# Patient Record
Sex: MALE | Race: BLACK OR AFRICAN AMERICAN | NOT HISPANIC OR LATINO | URBAN - METROPOLITAN AREA
[De-identification: names, ages, dates, MRNs, and addresses within clinical notes are randomized per-mention and may not be internally consistent; named-entity substitution may affect disease eponyms.]

---

## 2023-10-09 ENCOUNTER — OFFICE VISIT (OUTPATIENT)
Dept: PRIMARY CARE | Facility: CLINIC | Age: 53
End: 2023-10-09
Payer: COMMERCIAL

## 2023-10-09 VITALS
HEART RATE: 76 BPM | SYSTOLIC BLOOD PRESSURE: 122 MMHG | RESPIRATION RATE: 15 BRPM | OXYGEN SATURATION: 99 % | TEMPERATURE: 98.4 F | HEIGHT: 68 IN | DIASTOLIC BLOOD PRESSURE: 70 MMHG | WEIGHT: 142.9 LBS | BODY MASS INDEX: 21.66 KG/M2

## 2023-10-09 DIAGNOSIS — Z12.11 SCREENING FOR COLON CANCER: ICD-10-CM

## 2023-10-09 DIAGNOSIS — Z00.00 ROUTINE HISTORY AND PHYSICAL EXAMINATION OF ADULT: Primary | ICD-10-CM

## 2023-10-09 DIAGNOSIS — Z23 FLU VACCINE NEED: ICD-10-CM

## 2023-10-09 PROBLEM — M21.6X2: Status: ACTIVE | Noted: 2023-10-09

## 2023-10-09 PROBLEM — M77.11 LATERAL EPICONDYLITIS OF RIGHT ELBOW: Status: ACTIVE | Noted: 2023-10-09

## 2023-10-09 PROBLEM — T78.40XA ALLERGIC REACTION: Status: ACTIVE | Noted: 2023-10-09

## 2023-10-09 PROBLEM — Z20.822 EXPOSURE TO SEVERE ACUTE RESPIRATORY SYNDROME CORONAVIRUS 2 (SARS-COV-2): Status: ACTIVE | Noted: 2023-10-09

## 2023-10-09 PROBLEM — M77.52 CAPSULITIS OF TOE OF LEFT FOOT: Status: ACTIVE | Noted: 2023-10-09

## 2023-10-09 PROBLEM — K62.5 RECTAL BLEEDING: Status: ACTIVE | Noted: 2023-10-09

## 2023-10-09 PROBLEM — L23.4 ALLERGIC CONTACT DERMATITIS DUE TO DYES: Status: ACTIVE | Noted: 2023-10-09

## 2023-10-09 PROBLEM — L84 CALLUS OF FOOT: Status: ACTIVE | Noted: 2023-10-09

## 2023-10-09 PROCEDURE — 99386 PREV VISIT NEW AGE 40-64: CPT | Performed by: INTERNAL MEDICINE

## 2023-10-09 PROCEDURE — 90471 IMMUNIZATION ADMIN: CPT | Performed by: INTERNAL MEDICINE

## 2023-10-09 PROCEDURE — 90686 IIV4 VACC NO PRSV 0.5 ML IM: CPT | Performed by: INTERNAL MEDICINE

## 2023-10-09 ASSESSMENT — ENCOUNTER SYMPTOMS
ROS SKIN COMMENTS: HX OF ECZEMA
ADENOPATHY: 0
CONSTITUTIONAL NEGATIVE: 1
HEADACHES: 0
BLOOD IN STOOL: 0
DYSPHORIC MOOD: 0
JOINT SWELLING: 0
ABDOMINAL PAIN: 0
DIZZINESS: 0
NERVOUS/ANXIOUS: 0
NUMBNESS: 0
WHEEZING: 0
BRUISES/BLEEDS EASILY: 0
VOMITING: 0
SHORTNESS OF BREATH: 0
DYSURIA: 0
COUGH: 0
EYES NEGATIVE: 1
DIARRHEA: 0
NAUSEA: 0

## 2023-10-09 ASSESSMENT — PATIENT HEALTH QUESTIONNAIRE - PHQ9
SUM OF ALL RESPONSES TO PHQ9 QUESTIONS 1 & 2: 0
1. LITTLE INTEREST OR PLEASURE IN DOING THINGS: NOT AT ALL
2. FEELING DOWN, DEPRESSED OR HOPELESS: NOT AT ALL

## 2023-10-09 ASSESSMENT — LIFESTYLE VARIABLES
HOW OFTEN DO YOU HAVE A DRINK CONTAINING ALCOHOL: MONTHLY OR LESS
HOW OFTEN DO YOU HAVE SIX OR MORE DRINKS ON ONE OCCASION: NEVER
HOW MANY STANDARD DRINKS CONTAINING ALCOHOL DO YOU HAVE ON A TYPICAL DAY: 1 OR 2
SKIP TO QUESTIONS 9-10: 1
AUDIT-C TOTAL SCORE: 1

## 2023-10-09 NOTE — LETTER
October 9, 2023     Patient: Saul Lino   YOB: 1970   Date of Visit: 10/9/2023       To Whom It May Concern:    Saul Lino was seen in my clinic on 10/9/2023 at 1:00 pm. Please excuse Saul for his absence from work on this day to make the appointment.    If you have any questions or concerns, please don't hesitate to call.         Sincerely,         Marcy Blackwell MD        CC: No Recipients

## 2023-10-09 NOTE — PATIENT INSTRUCTIONS
Assessment/Plan     Saul was seen today for annual exam.  Diagnoses and all orders for this visit:  Routine history and physical examination of adult (Primary)  Comments:  There were no acute physical findings present on examination.  Plan: Continue to eat a healthy wel-balanced diet  -exercise regularly as tolerated  Orders:  -     Lipid Panel; Future  -     Basic Metabolic Panel; Future  -     CBC; Future  -     Prostate Specific Antigen, Screen; Future  Flu vaccine need  -     Flu vaccine (IIV4) age 6 months and greater, preservative free  Screening for colon cancer  -     Colonoscopy Screening; Future      F/U in 1 year and as needed

## 2023-10-09 NOTE — PROGRESS NOTES
Subjective   Patient ID: Saul Lino is a 52 y.o. male who presents for Annual Exam.  The patient is a 51 YO male who is being seen today for a physical.         Review of Systems   Constitutional: Negative.    HENT: Negative.     Eyes: Negative.         Most recent eye exam was 1 year ago.   Respiratory:  Negative for cough, shortness of breath and wheezing.    Cardiovascular:  Negative for chest pain and leg swelling.   Gastrointestinal:  Negative for abdominal pain, blood in stool, diarrhea, nausea and vomiting.   Genitourinary:  Negative for dysuria.   Musculoskeletal:  Negative for joint swelling.   Skin:         Hx of Eczema   Neurological:  Negative for dizziness, syncope, numbness and headaches.   Hematological:  Negative for adenopathy. Does not bruise/bleed easily.   Psychiatric/Behavioral:  Negative for dysphoric mood. The patient is not nervous/anxious.        Objective   Physical Exam  Vitals reviewed.   Constitutional:       Appearance: Normal appearance.   HENT:      Head: Normocephalic.      Right Ear: Tympanic membrane and external ear normal.      Left Ear: Tympanic membrane and external ear normal.      Mouth/Throat:      Mouth: Mucous membranes are moist.      Pharynx: Oropharynx is clear.   Eyes:      Extraocular Movements: Extraocular movements intact.      Conjunctiva/sclera: Conjunctivae normal.      Pupils: Pupils are equal, round, and reactive to light.   Cardiovascular:      Rate and Rhythm: Normal rate and regular rhythm.      Heart sounds: Normal heart sounds.   Pulmonary:      Effort: Pulmonary effort is normal.      Breath sounds: Normal breath sounds.   Abdominal:      General: Bowel sounds are normal.      Palpations: Abdomen is soft.      Tenderness: There is no abdominal tenderness.   Musculoskeletal:      Cervical back: Normal range of motion and neck supple. No tenderness.      Right lower leg: No edema.      Left lower leg: No edema.   Lymphadenopathy:      Cervical: No cervical  adenopathy.   Skin:     General: Skin is warm and dry.   Neurological:      General: No focal deficit present.      Mental Status: He is alert and oriented to person, place, and time.   Psychiatric:         Mood and Affect: Mood normal.         Behavior: Behavior normal.         Assessment/Plan     Saul was seen today for annual exam.  Diagnoses and all orders for this visit:  Routine history and physical examination of adult (Primary)  Comments:  There were no acute physical findings present on examination.  Plan: Continue to eat a healthy wel-balanced diet  -exercise regularly as tolerated  Orders:  -     Lipid Panel; Future  -     Basic Metabolic Panel; Future  -     CBC; Future  -     Prostate Specific Antigen, Screen; Future  Flu vaccine need  -     Flu vaccine (IIV4) age 6 months and greater, preservative free  Screening for colon cancer  -     Colonoscopy Screening; Future      F/U in 1 year and as needed

## 2024-01-09 ENCOUNTER — APPOINTMENT (OUTPATIENT)
Dept: GASTROENTEROLOGY | Facility: EXTERNAL LOCATION | Age: 54
End: 2024-01-09
Payer: COMMERCIAL

## 2024-02-09 ENCOUNTER — OFFICE VISIT (OUTPATIENT)
Dept: GASTROENTEROLOGY | Facility: EXTERNAL LOCATION | Age: 54
End: 2024-02-09
Payer: COMMERCIAL

## 2024-02-09 DIAGNOSIS — K57.30 DIVERTICULOSIS OF LARGE INTESTINE WITHOUT DIVERTICULITIS: Primary | ICD-10-CM

## 2024-02-09 DIAGNOSIS — Z12.11 SCREENING FOR COLON CANCER: ICD-10-CM

## 2024-02-09 DIAGNOSIS — K64.8 INTERNAL HEMORRHOIDS: ICD-10-CM

## 2024-02-09 PROCEDURE — 45378 DIAGNOSTIC COLONOSCOPY: CPT | Performed by: INTERNAL MEDICINE

## 2024-11-05 ENCOUNTER — APPOINTMENT (OUTPATIENT)
Dept: PRIMARY CARE | Facility: CLINIC | Age: 54
End: 2024-11-05
Payer: COMMERCIAL

## 2025-03-04 ENCOUNTER — HOSPITAL ENCOUNTER (OUTPATIENT)
Dept: RADIOLOGY | Facility: CLINIC | Age: 55
Discharge: HOME | End: 2025-03-04
Payer: COMMERCIAL

## 2025-03-04 ENCOUNTER — APPOINTMENT (OUTPATIENT)
Dept: PRIMARY CARE | Facility: CLINIC | Age: 55
End: 2025-03-04
Payer: COMMERCIAL

## 2025-03-04 VITALS
WEIGHT: 138 LBS | HEART RATE: 81 BPM | OXYGEN SATURATION: 96 % | BODY MASS INDEX: 21.66 KG/M2 | DIASTOLIC BLOOD PRESSURE: 68 MMHG | HEIGHT: 67 IN | SYSTOLIC BLOOD PRESSURE: 140 MMHG

## 2025-03-04 DIAGNOSIS — M79.642 BILATERAL HAND PAIN: ICD-10-CM

## 2025-03-04 DIAGNOSIS — M79.641 BILATERAL HAND PAIN: ICD-10-CM

## 2025-03-04 DIAGNOSIS — Z76.89 ENCOUNTER TO ESTABLISH CARE WITH NEW DOCTOR: Primary | ICD-10-CM

## 2025-03-04 PROCEDURE — 3008F BODY MASS INDEX DOCD: CPT | Performed by: INTERNAL MEDICINE

## 2025-03-04 PROCEDURE — 73130 X-RAY EXAM OF HAND: CPT | Mod: 50

## 2025-03-04 PROCEDURE — 99214 OFFICE O/P EST MOD 30 MIN: CPT | Performed by: INTERNAL MEDICINE

## 2025-03-04 ASSESSMENT — PATIENT HEALTH QUESTIONNAIRE - PHQ9
1. LITTLE INTEREST OR PLEASURE IN DOING THINGS: NOT AT ALL
2. FEELING DOWN, DEPRESSED OR HOPELESS: NOT AT ALL
SUM OF ALL RESPONSES TO PHQ9 QUESTIONS 1 AND 2: 0

## 2025-03-04 ASSESSMENT — ENCOUNTER SYMPTOMS: CONSTITUTIONAL NEGATIVE: 1

## 2025-03-04 NOTE — LETTER
March 4, 2025     Patient: Saul Lino   YOB: 1970   Date of Visit: 3/4/2025       To Whom It May Concern:    Saul Lino was seen in my clinic on 3/4/2025 at 1:30 pm. Please excuse Saul for his absence from work on this day to make the appointment.    If you have any questions or concerns, please don't hesitate to call.         Sincerely,         Nyasia Lopez MD        CC: No Recipients

## 2025-03-04 NOTE — PROGRESS NOTES
"Patient ID: Saul Lino is a 54 y.o. male who presents for Establish Care (New patient here to establish care).    /68   Pulse 81   Ht 1.702 m (5' 7\")   Wt 62.6 kg (138 lb)   SpO2 96%   BMI 21.61 kg/m²     HPI        Patient patient is here to establish care      He is having some type of stiffness in both hands  He is a  work for [employer]  Sometimes he feels like both the hands are cramping up        Noted to have elevated blood pressure without any history of hypertension                  Subjective     Review of Systems   Constitutional: Negative.    All other systems reviewed and are negative.      Objective     Physical Exam  Vitals and nursing note reviewed.   Constitutional:       Appearance: Normal appearance.   Neck:      Vascular: No carotid bruit.   Cardiovascular:      Rate and Rhythm: Normal rate and regular rhythm.      Pulses: Normal pulses.      Heart sounds: Normal heart sounds.   Pulmonary:      Effort: Pulmonary effort is normal.      Breath sounds: Normal breath sounds.   Abdominal:      Palpations: There is no mass.   Musculoskeletal:      Right lower leg: No edema.      Left lower leg: No edema.      Comments: Both hands abduction adduction normal  There is slight soft tissue swelling    Both wrist flexion extension normal    Tinel's negative    Phalen sign negative   Skin:     Capillary Refill: Capillary refill takes more than 3 seconds.   Neurological:      General: No focal deficit present.      Mental Status: He is oriented to person, place, and time. Mental status is at baseline.   Psychiatric:         Mood and Affect: Mood normal.         Behavior: Behavior normal.         Thought Content: Thought content normal.         Judgment: Judgment normal.         Lab Results   Component Value Date    WBC 4.8 09/03/2020    HGB 15.2 09/03/2020    HCT 43.7 09/03/2020    MCV 99 09/03/2020     09/03/2020           Problem List Items Addressed This Visit    None  Visit Diagnoses  "      Encounter to establish care with new doctor    -  Primary    Relevant Orders    Comprehensive metabolic panel    Lipid panel    Prostate Spec.Ag,Screen    Bilateral hand pain        Relevant Orders    XR hand 3+ views bilateral                 A/P        SED RATE , CRP, RHEUMATOID FACTOR   X-RAYS BOTH HANDS   Tylenol as needed for pain  Follow-up as needed

## 2025-03-05 LAB
ALBUMIN SERPL-MCNC: 5 G/DL (ref 3.6–5.1)
ALP SERPL-CCNC: 83 U/L (ref 35–144)
ALT SERPL-CCNC: 21 U/L (ref 9–46)
ANION GAP SERPL CALCULATED.4IONS-SCNC: 9 MMOL/L (CALC) (ref 7–17)
AST SERPL-CCNC: 27 U/L (ref 10–35)
BILIRUB SERPL-MCNC: 0.5 MG/DL (ref 0.2–1.2)
BUN SERPL-MCNC: 9 MG/DL (ref 7–25)
CALCIUM SERPL-MCNC: 10.1 MG/DL (ref 8.6–10.3)
CHLORIDE SERPL-SCNC: 102 MMOL/L (ref 98–110)
CHOLEST SERPL-MCNC: 218 MG/DL
CHOLEST/HDLC SERPL: 2.2 (CALC)
CO2 SERPL-SCNC: 30 MMOL/L (ref 20–32)
CREAT SERPL-MCNC: 0.8 MG/DL (ref 0.7–1.3)
EGFRCR SERPLBLD CKD-EPI 2021: 104 ML/MIN/1.73M2
GLUCOSE SERPL-MCNC: 67 MG/DL (ref 65–99)
HDLC SERPL-MCNC: 97 MG/DL
LDLC SERPL CALC-MCNC: 100 MG/DL (CALC)
NONHDLC SERPL-MCNC: 121 MG/DL (CALC)
POTASSIUM SERPL-SCNC: 4.6 MMOL/L (ref 3.5–5.3)
PROT SERPL-MCNC: 7.6 G/DL (ref 6.1–8.1)
PSA SERPL-MCNC: 0.67 NG/ML
SODIUM SERPL-SCNC: 141 MMOL/L (ref 135–146)
TRIGL SERPL-MCNC: 117 MG/DL

## 2025-03-06 ENCOUNTER — TELEPHONE (OUTPATIENT)
Dept: PRIMARY CARE | Facility: CLINIC | Age: 55
End: 2025-03-06
Payer: COMMERCIAL

## 2025-03-06 NOTE — TELEPHONE ENCOUNTER
Pt wants to know if a brace will help and what can he take for the pain. He is a  and works with his hands all day

## 2025-04-09 ENCOUNTER — TELEPHONE (OUTPATIENT)
Dept: PRIMARY CARE | Facility: CLINIC | Age: 55
End: 2025-04-09
Payer: COMMERCIAL

## 2025-04-09 DIAGNOSIS — M79.641 PAIN IN BOTH HANDS: Primary | ICD-10-CM

## 2025-04-09 DIAGNOSIS — M79.642 PAIN IN BOTH HANDS: Primary | ICD-10-CM

## 2025-04-16 ENCOUNTER — TELEPHONE (OUTPATIENT)
Dept: PRIMARY CARE | Facility: CLINIC | Age: 55
End: 2025-04-16
Payer: COMMERCIAL

## 2025-04-22 ENCOUNTER — TELEPHONE (OUTPATIENT)
Dept: PRIMARY CARE | Facility: CLINIC | Age: 55
End: 2025-04-22
Payer: COMMERCIAL

## 2025-04-24 ENCOUNTER — OFFICE VISIT (OUTPATIENT)
Dept: PRIMARY CARE | Facility: CLINIC | Age: 55
End: 2025-04-24
Payer: COMMERCIAL

## 2025-04-24 VITALS
DIASTOLIC BLOOD PRESSURE: 64 MMHG | OXYGEN SATURATION: 97 % | HEART RATE: 68 BPM | HEIGHT: 67 IN | WEIGHT: 136.4 LBS | BODY MASS INDEX: 21.41 KG/M2 | SYSTOLIC BLOOD PRESSURE: 114 MMHG

## 2025-04-24 DIAGNOSIS — M19.041 ARTHRITIS OF BOTH HANDS: Primary | ICD-10-CM

## 2025-04-24 DIAGNOSIS — M19.042 ARTHRITIS OF BOTH HANDS: Primary | ICD-10-CM

## 2025-04-24 PROCEDURE — 3008F BODY MASS INDEX DOCD: CPT | Performed by: INTERNAL MEDICINE

## 2025-04-24 PROCEDURE — 99214 OFFICE O/P EST MOD 30 MIN: CPT | Performed by: INTERNAL MEDICINE

## 2025-04-24 RX ORDER — MELOXICAM 7.5 MG/1
7.5 TABLET ORAL DAILY
Qty: 30 TABLET | Refills: 1 | Status: SHIPPED | OUTPATIENT
Start: 2025-04-24 | End: 2026-04-24

## 2025-04-24 ASSESSMENT — ENCOUNTER SYMPTOMS: CONSTITUTIONAL NEGATIVE: 1

## 2025-04-24 NOTE — PROGRESS NOTES
"Patient ID: Saul Lino is a 54 y.o. male who presents for Follow-up ([place] forms ).    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    All other systems reviewed and are negative.      Objective     Physical Exam  Vitals and nursing note reviewed.   Constitutional:       Appearance: Normal appearance.   Neck:      Vascular: No carotid bruit.   Cardiovascular:      Rate and Rhythm: Normal rate and regular rhythm.      Pulses: Normal pulses.      Heart sounds: Normal heart sounds. No murmur heard.  Pulmonary:      Effort: Pulmonary effort is normal.      Breath sounds: Normal breath sounds.   Abdominal:      Palpations: There is no mass.   Musculoskeletal:      Right lower leg: No edema.      Left lower leg: No edema.      Comments: Left hand slight soft tissue swelling  More with the index and the middle finger  And over the carpometacarpal area    Both handgrips are painful  Flexion extension painful   Skin:     Capillary Refill: Capillary refill takes more than 3 seconds.   Neurological:      General: No focal deficit present.      Mental Status: He is oriented to person, place, and time. Mental status is at baseline.   Psychiatric:         Mood and Affect: Mood normal.         Behavior: Behavior normal.         Thought Content: Thought content normal.         " Judgment: Judgment normal.         Lab Results   Component Value Date    WBC 4.8 09/03/2020    HGB 15.2 09/03/2020    HCT 43.7 09/03/2020    MCV 99 09/03/2020     09/03/2020           Problem List Items Addressed This Visit    None  Visit Diagnoses         Arthritis of both hands    -  Primary    Relevant Medications    meloxicam (Mobic) 7.5 mg tablet               A/P      FMLA form completed  It has been faxed  And the form is supposed to be scanned  Meloxicam 7.5 mg 1 tablet every day  To take with food and 1 glass of water  While taking meloxicam not to take any Motrin or ibuprofen  Patient is going to do sed rate, CRP, rheumatoid factor  And he has an appointment with hand Ortho on 4/29/2025  Discussed the effect of smoking on overall all-cause more michel  Discussed the effect of smoking on physical mental wellbeing  Discussed the effect of smoking on cancer/malignancy, peripheral vascular disease cerebrovascular disease and cardiovascular disease

## 2025-04-24 NOTE — LETTER
April 24, 2025     Patient: Saul Lino   YOB: 1970   Date of Visit: 4/24/2025       To Whom It May Concern:    Saul Lino was seen in my clinic on 4/24/2025 at 4:10 pm. Please excuse Saul for his absence from work on this day to make the appointment.    If you have any questions or concerns, please don't hesitate to call.         Sincerely,         Nyasia Lopez MD        CC: No Recipients

## 2025-04-29 ENCOUNTER — OFFICE VISIT (OUTPATIENT)
Dept: ORTHOPEDIC SURGERY | Facility: HOSPITAL | Age: 55
End: 2025-04-29
Payer: COMMERCIAL

## 2025-04-29 DIAGNOSIS — M79.642 PAIN IN BOTH HANDS: ICD-10-CM

## 2025-04-29 DIAGNOSIS — M79.641 PAIN IN BOTH HANDS: ICD-10-CM

## 2025-04-29 PROCEDURE — 99204 OFFICE O/P NEW MOD 45 MIN: CPT | Performed by: STUDENT IN AN ORGANIZED HEALTH CARE EDUCATION/TRAINING PROGRAM

## 2025-04-29 PROCEDURE — 99214 OFFICE O/P EST MOD 30 MIN: CPT | Performed by: STUDENT IN AN ORGANIZED HEALTH CARE EDUCATION/TRAINING PROGRAM

## 2025-04-30 NOTE — PROGRESS NOTES
CHIEF COMPLAINT: Right middle finger MCP pain and stiffness  DOI:none  DOS:none      HISTORY OF PRESENT ILLNESS    This is a very pleasant 54-year-old male, right-hand-dominant, works hanging drywall with plaster, lives alone, denies active tobacco use denies diabetes diagnosis denies anticoagulation use denies any known diagnosis of rheumatoid arthritis or gout.  He is presenting as a new patient evaluation for acute on chronic worsening of right middle finger MCP pain and stiffness as well as diffuse cramping in his hand.  He denies any component numbness tingling paresthesias.  He has had no directed treatments for this issue before.      PHYSICAL EXAM    Extremities / Musculoskeletal:                  Right hand:  Modest diffuse edema about the dorsal aspect of the middle finger MCP.  Boggy synovitis.  Otherwise no gross deformity no active skin lesions no open wounds.  Slight angular deformity to the right index finger DIP joint.  Full composite digital flexion although pain about the middle finger MCP at terminal flexion.  Strong resisted extension MCP and IP joints.  FDS FDP intact.  MCP joint stable to coronal stressing.  No signs of subluxing extensor tendons.  Tender palpation along MCP joint line.  Nontender palpation along the A1 pulley. Motor intact to radian/PIN/median/AIN/Ulnar nerve distributions. Sensation intact to light touch in the median/ulnar/radial nerve distributions. 2+ radial pulse at the wrist, hand and all digits are warm and well-perfused with a brisk capillary refill of <2s.         IMAGING / LABS / EMGs:    Right hand x-ray series performed on 3/4/2025 independently reviewed demonstrating mild joint space narrowing and marginal osteophytes about the middle finger MCP joint.  Overall stable global alignment.  No signs of acute fracture or dislocation.    Medical History[1]    Medication Documentation Review Audit       Reviewed by Henrietta Duarte MA (Medical Assistant) on 04/29/25  at 1049      Medication Order Taking? Sig Documenting Provider Last Dose Status   meloxicam (Mobic) 7.5 mg tablet [id number]  Take 1 tablet (7.5 mg) by mouth once daily. Nyasia Lopez MD  Active                    RX Allergies[2]    Surgical History[3]      ASSESSMENT:   Symptomatic right middle finger MCP osteoarthritis    We discussed my assessment as well as available treatment options.  He has had no treatments yet so I think would be reasonable to proceed with conservative measures.  We discussed that we do not have many good surgical solutions for MCP arthritis.  I think it be reasonable to consider an intra-articular corticosteroid injection if he felt that his pain was adequately controlled by conservative measures.    PLAN:   Discussed over-the-counter heating gloves, over-the-counter NSAIDs as needed, icing after high demand days    Follow-up in: Although we do not need a scheduled follow-up visit at this time, I encouraged the patient to return to clinic if they have any concerns or issues whatsoever. The patient was provided with my office's contact information.   XR at next visit: none    The diagnosis and treatment plan were reviewed with the patient. All questions were answered. The patient verbalized understanding of the treatment plan. There were no barriers to understanding identified.     Note dictated with Wix software.  Completed without full type editing and sent to avoid delay.    Jesus Mcbride M.D.    Department of Orthopaedics  Hand/Upper Extremity  Phone: [phone]  Appt. Phone: [phone]\         [1]   Past Medical History:  Diagnosis Date    Encounter for screening for malignant neoplasm of colon 04/12/2019    Screening for colon cancer    Personal history of other diseases of the nervous system and sense organs     History of carpal tunnel syndrome    Personal history of other mental and behavioral disorders     History of anxiety  disorder    Personal history of other specified conditions     History of abnormal weight loss    Preglaucoma, unspecified, unspecified eye     Glaucoma suspect   [2] No Known Allergies  [3]   Past Surgical History:  Procedure Laterality Date    ANKLE SURGERY  04/26/2016    Ankle Surgery

## 2025-05-03 LAB
CRP SERPL-MCNC: NORMAL MG/L
ERYTHROCYTE [SEDIMENTATION RATE] IN BLOOD BY WESTERGREN METHOD: 2 MM/H
RHEUMATOID FACT SERPL-ACNC: NORMAL [IU]/ML

## 2025-05-05 LAB
CRP SERPL-MCNC: 4 MG/L
ERYTHROCYTE [SEDIMENTATION RATE] IN BLOOD BY WESTERGREN METHOD: 2 MM/H
RHEUMATOID FACT SERPL-ACNC: 87 IU/ML

## 2025-05-06 DIAGNOSIS — M05.742 RHEUMATOID ARTHRITIS INVOLVING BOTH HANDS WITH POSITIVE RHEUMATOID FACTOR (MULTI): Primary | ICD-10-CM

## 2025-05-06 DIAGNOSIS — M05.741 RHEUMATOID ARTHRITIS INVOLVING BOTH HANDS WITH POSITIVE RHEUMATOID FACTOR (MULTI): Primary | ICD-10-CM

## 2025-11-11 ENCOUNTER — APPOINTMENT (OUTPATIENT)
Dept: PRIMARY CARE | Facility: CLINIC | Age: 55
End: 2025-11-11
Payer: COMMERCIAL